# Patient Record
Sex: MALE | Race: WHITE | HISPANIC OR LATINO | Employment: FULL TIME | URBAN - METROPOLITAN AREA
[De-identification: names, ages, dates, MRNs, and addresses within clinical notes are randomized per-mention and may not be internally consistent; named-entity substitution may affect disease eponyms.]

---

## 2024-03-27 ENCOUNTER — HOSPITAL ENCOUNTER (EMERGENCY)
Facility: CLINIC | Age: 19
Discharge: HOME | End: 2024-03-27
Admitting: EMERGENCY MEDICINE
Payer: COMMERCIAL

## 2024-03-27 VITALS
WEIGHT: 155 LBS | HEIGHT: 72 IN | HEART RATE: 70 BPM | TEMPERATURE: 97.6 F | BODY MASS INDEX: 20.99 KG/M2 | DIASTOLIC BLOOD PRESSURE: 68 MMHG | OXYGEN SATURATION: 97 % | SYSTOLIC BLOOD PRESSURE: 118 MMHG | RESPIRATION RATE: 16 BRPM

## 2024-03-27 DIAGNOSIS — S61.219A LACERATION OF FINGER: ICD-10-CM

## 2024-03-27 PROCEDURE — 250N000011 HC RX IP 250 OP 636: Performed by: EMERGENCY MEDICINE

## 2024-03-27 PROCEDURE — 99283 EMERGENCY DEPT VISIT LOW MDM: CPT | Mod: 25

## 2024-03-27 PROCEDURE — 12002 RPR S/N/AX/GEN/TRNK2.6-7.5CM: CPT

## 2024-03-27 PROCEDURE — 90471 IMMUNIZATION ADMIN: CPT | Performed by: EMERGENCY MEDICINE

## 2024-03-27 PROCEDURE — 90715 TDAP VACCINE 7 YRS/> IM: CPT | Performed by: EMERGENCY MEDICINE

## 2024-03-27 RX ADMIN — CLOSTRIDIUM TETANI TOXOID ANTIGEN (FORMALDEHYDE INACTIVATED), CORYNEBACTERIUM DIPHTHERIAE TOXOID ANTIGEN (FORMALDEHYDE INACTIVATED), BORDETELLA PERTUSSIS TOXOID ANTIGEN (GLUTARALDEHYDE INACTIVATED), BORDETELLA PERTUSSIS FILAMENTOUS HEMAGGLUTININ ANTIGEN (FORMALDEHYDE INACTIVATED), BORDETELLA PERTUSSIS PERTACTIN ANTIGEN, AND BORDETELLA PERTUSSIS FIMBRIAE 2/3 ANTIGEN 0.5 ML: 5; 2; 2.5; 5; 3; 5 INJECTION, SUSPENSION INTRAMUSCULAR at 15:33

## 2024-03-27 ASSESSMENT — COLUMBIA-SUICIDE SEVERITY RATING SCALE - C-SSRS
6. HAVE YOU EVER DONE ANYTHING, STARTED TO DO ANYTHING, OR PREPARED TO DO ANYTHING TO END YOUR LIFE?: NO
1. IN THE PAST MONTH, HAVE YOU WISHED YOU WERE DEAD OR WISHED YOU COULD GO TO SLEEP AND NOT WAKE UP?: NO
2. HAVE YOU ACTUALLY HAD ANY THOUGHTS OF KILLING YOURSELF IN THE PAST MONTH?: NO

## 2024-03-27 NOTE — ED TRIAGE NOTES
Pt presents with laceration to tip of left thumb. CMS intact bleeding controlled. Pt unsure of last tetanus     Triage Assessment (Adult)       Row Name 03/27/24 1501          Triage Assessment    Airway WDL WDL        Respiratory WDL    Respiratory WDL WDL        Skin Circulation/Temperature WDL    Skin Circulation/Temperature WDL WDL        Cardiac WDL    Cardiac WDL WDL        Peripheral/Neurovascular WDL    Peripheral Neurovascular WDL WDL        Cognitive/Neuro/Behavioral WDL    Cognitive/Neuro/Behavioral WDL WDL

## 2024-03-27 NOTE — ED PROVIDER NOTES
EMERGENCY DEPARTMENT ENCOUNTER      NAME: Mark Huntley  AGE: 19 year old male  YOB: 2005  MRN: [id number]  EVALUATION DATE & TIME: No admission date for patient encounter.    PCP: No primary care provider on file.    ED PROVIDER: Adia Alberts PA-C      Chief Complaint   Patient presents with    Laceration         FINAL IMPRESSION:  1. Laceration of finger          MEDICAL DECISION MAKING:    Pertinent Labs & Imaging studies reviewed. (See chart for details)  19 year old male presents to the Emergency Department for evaluation of a laceration to the left index finger. The patient was at work when he accidentally cut his left index finger with a . Wound needed repair so he presented to the ED. On my evaluation, the patient was vitally stable and overall well appearing.examination with left index finger with 2 cm laceration to the tip, bleeding controlled, distal CMS intact.     No crush injury or other significant trauma that would concern me for fracture.  Without any bony tenderness I do not feel x-ray of the index finger is indicated at this time especially with no concern for foreign body.  This was discussed with patient he was understanding.  Wound was cleaned and we discussed repair with stitches versus glue and at this time patient would like to proceed with Dermabond.  I feel that this is reasonable.  We discussed wound cares as well as reasons to return to the emergency department or follow-up with primary care and patient was in agreement understanding.  Tetanus was updated today.  I do not feel prophylactic antibiotics are indicated at this time.  All questions were answered best my ability and he was discharged in stable condition.    Medical Decision Making  Obtained supplemental history:Supplemental history obtained?: No  Reviewed external records: External records reviewed?: No  Care impacted by chronic illness:N/A  Care significantly affected by social  determinants of health:Access to Medical Care  Did you consider but not order tests?: Work up considered but not performed and documented in chart, if applicable  Did you interpret images independently?: Independent interpretation of ECG and images noted in documentation, when applicable.  Consultation discussion with other provider:Did you involve another provider (consultant, MH, pharmacy, etc.)?: No  Discharge. No recommendations on prescription strength medication(s). See documentation for any additional details.     ED COURSE:  3:15 PM I met with the patient, obtained history, performed an initial exam, and discussed options and plan for diagnostics and treatment here in the ED.    3:56 PM Patient discharged after being provided with extensive anticipatory guidance and given return precautions, importance of PCP follow-up emphasized.    At the conclusion of the encounter I discussed the results of all of the tests and the disposition. The questions were answered. The patient or family acknowledged understanding and was agreeable with the care plan.     MEDICATIONS GIVEN IN THE EMERGENCY:  Medications   Tdap (tetanus-diphtheria-acell pertussis) (ADACEL) injection 0.5 mL (0.5 mLs Intramuscular $Given 3/27/24 7393)       NEW PRESCRIPTIONS STARTED AT TODAY'S ER VISIT  There are no discharge medications for this patient.           =================================================================    HPI:    Patient information was obtained from: The patient    Use of Interpretor: N/A          Mark Huntley is a 19 year old male who presents to this ED via private vehicle for evaluation of a laceration to the left index finger. The patient was at work when he accidentally cut his left index finger with a . Wound needed repair so he presented to the ED. On my evaluation, the patient denies any other injury. He denies significant pain. No numbness/tingling. No other trauma to the finger/hand. No other  concerns voiced at this time.       REVIEW OF SYSTEMS:  Negative unless otherwise stated in the above HPI.       PAST MEDICAL HISTORY:  No past medical history on file.    PAST SURGICAL HISTORY:  No past surgical history on file.        CURRENT MEDICATIONS:    No current facility-administered medications for this encounter.  No current outpatient medications on file.      ALLERGIES:  No Known Allergies    FAMILY HISTORY:  No family history on file.    SOCIAL HISTORY:        VITALS:  Patient Vitals for the past 24 hrs:   BP Temp Temp src Pulse Resp SpO2 Height Weight   03/27/24 1500 118/68 97.6  F (36.4  C) Temporal 70 16 97 % 1.829 m (6') 70.3 kg (155 lb)       PHYSICAL EXAM    Constitutional: Well developed, Well nourished, NAD  HENT: Normocephalic, Atraumatic, Bilateral external ears normal, Oropharynx normal, mucous membranes moist, Nose normal.   Neck: Normal range of motion, No tenderness, Supple, No stridor.  Eyes: Eyes track normally throughout, Conjunctiva normal, No discharge.   Respiratory: Normal breath sounds, No respiratory distress, No wheezing, Speaks full sentences easily. No cough.  Cardiovascular: Normal heart rate, Regular rhythm, No murmurs, No rubs, No gallops. Chest wall nontender.  GI: Soft, No tenderness, No masses, No flank tenderness. No rebound or guarding.  Musculoskeletal: Good range of motion in all major joints.   Integument: Left index finger with 2 cm laceration to the tip, bleeding controlled, distal CMS intact. Warm, Dry, No erythema, No rash. No petechiae.  Neurologic: Alert & oriented x 3, Normal motor function, Normal sensory function, No focal deficits noted. Normal gait.  Psychiatric: Affect normal, Judgment normal, Mood normal. Cooperative.    LAB:  All pertinent labs reviewed and interpreted.  No results found for this or any previous visit (from the past 24 hour(s)).      RADIOLOGY:  Reviewed all pertinent imaging. Please see official radiology report.  No orders to display        PROCEDURES:   PROCEDURE: Laceration Repair   INDICATIONS: Laceration   PROCEDURE PROVIDER: Adia Alberts PA-C   SITE: Left index finger   TYPE/SIZE: simple, clean, and no foreign body visualized  2 cm (total length)   FUNCTIONAL ASSESSMENT: Distal sensation, circulation, motor, and tendon function intact   MEDICATION: None   PREPARATION: soaking and scrubbing with Normal saline and Hibiclens   DEBRIDEMENT: no debridement and wound explored, no foreign body found   CLOSURE:  Superficial layer closed with Dermabond (medical glue)    Total number of sutures/staples placed: 0       Adia Alberts PA-C  Emergency Medicine  [facility]  3/27/2024     Adia Alberts PA-C  03/29/24 0842

## 2024-03-27 NOTE — DISCHARGE INSTRUCTIONS
Evaluated here in the emergency department after lacerating your left thumb.  Fortunately, wound was cleaned here and able to be repaired with glue.  Tetanus updated here.  Watch for signs and symptoms of infection including increased redness, swelling, purulent drainage, fevers or other concerns if these occur please return to the emergency department or be seen by a healthcare provider.    Keep the wound clean and dry but do not submerge in gross dirty water.  Try not to pull at the edges of the glue and trim as needed.  Glue comes off that is not a big concern and would not need reapplication.    Return with concerns for infection or any other reasons otherwise, follow-up with primary care as needed.